# Patient Record
Sex: MALE
[De-identification: names, ages, dates, MRNs, and addresses within clinical notes are randomized per-mention and may not be internally consistent; named-entity substitution may affect disease eponyms.]

---

## 2018-07-04 ENCOUNTER — HOSPITAL ENCOUNTER (EMERGENCY)
Dept: HOSPITAL 42 - ED | Age: 23
Discharge: LEFT BEFORE DISCHARGE | End: 2018-07-04
Payer: MEDICAID

## 2018-07-04 VITALS — BODY MASS INDEX: 27.3 KG/M2

## 2018-07-04 VITALS
SYSTOLIC BLOOD PRESSURE: 136 MMHG | DIASTOLIC BLOOD PRESSURE: 83 MMHG | RESPIRATION RATE: 18 BRPM | OXYGEN SATURATION: 98 % | TEMPERATURE: 98.3 F | HEART RATE: 88 BPM

## 2018-07-04 DIAGNOSIS — R10.31: Primary | ICD-10-CM

## 2018-07-04 DIAGNOSIS — Z91.19: ICD-10-CM

## 2018-07-04 NOTE — ED PDOC
Arrival/HPI





- General


Chief Complaint: Abdominal Pain


Time Seen by Provider: 07/04/18 17:22


Historian: Patient





- History of Present Illness


Narrative History of Present Illness (Text): 





07/04/18 17:23


22 y/o male, no significant pmh, nkda, c/o RLQ pain started today.  Pt. stated 

that he has RLQ pain started acutely this morning, non-radiating, associated 

with walking, stated that he was exercising yesterday but with no fall or trauma

, no numbness or tingling, no urinary symptoms, no fever or chills, no flank 

pain, no other medical or psychological complaints. 





Past Medical History





- Provider Review


Nursing Documentation Reviewed: Yes





- Past History


Past History: No Previous





- Infectious Disease


Hx of Infectious Diseases: None





- Tetanus Immunization


Tetanus Immunization: Up to Date





- Past Medical History


Past Medical History: No Previous





- Psychiatric


Hx Depression: No


Hx Emotional Abuse: No


Hx Physical Abuse: No


Hx Substance Use: No





- Past Surgical History


Past Surgical History: No Previous





- Anesthesia


Hx Anesthesia: No





- Suicidal Assessment


Feels Threatened In Home Enviroment: No





Family/Social History





- Physician Review


Nursing Documentation Reviewed: Yes


Family/Social History: Unknown Family HX


Smoking Status: Unknown If Ever Smoked


Hx Alcohol Use: No


Hx Substance Use: No


Hx Substance Use Treatment: No





Allergies/Home Meds


Allergies/Adverse Reactions: 


Allergies





seasonal Allergy (Uncoded 07/04/18 17:20)


 CONGESTION








Home Medications: 


 Home Meds











 Medication  Instructions  Recorded  Confirmed


 


No Known Home Med [No Known Home  12/21/13 07/04/18





Med]   














Review of Systems





- Review of Systems


Constitutional: absent: Fatigue, Fevers


Eyes: absent: Vision Changes


ENT: absent: Hearing Changes


Respiratory: absent: SOB, Cough


Cardiovascular: absent: Chest Pain


Gastrointestinal: Abdominal Pain.  absent: Diarrhea, Nausea, Vomiting


Skin: absent: Rash, Pruritis


Neurological: absent: Headache, Dizziness


Psychiatric: absent: Anxiety, Depression, Suicidal Ideation





Physical Exam


Vital Signs Reviewed: Yes


Vital Signs











  Temp Pulse Resp BP Pulse Ox


 


 07/04/18 18:35  98.3 F  88  18  136/83  98


 


 07/04/18 17:16  98.3 F  88  18  136/83  98











Temperature: Afebrile


Blood Pressure: Normal


Pulse: Regular


Respiratory Rate: Normal


Appearance: Positive for: Well-Appearing, Non-Toxic, Comfortable


Pain Distress: Moderate


Mental Status: Positive for: Alert and Oriented X 3





- Systems Exam


Head: Present: Atraumatic, Normocephalic


Pupils: Present: PERRL


Extroacular Muscles: Present: EOMI


Conjunctiva: Present: Normal


Mouth: Present: Moist Mucous Membranes


Neck: Present: Normal Range of Motion


Respiratory/Chest: Present: Clear to Auscultation, Good Air Exchange.  No: 

Respiratory Distress, Accessory Muscle Use


Cardiovascular: Present: Regular Rate and Rhythm, Normal S1, S2.  No: Murmurs


Abdomen: Present: Tenderness (+ttp on the RLQ region, no cva tenderness).  No: 

Distention, Peritoneal Signs, Rebound, Guarding


Back: Present: Normal Inspection.  No: CVA Tenderness, Midline Tenderness


Upper Extremity: Present: Normal Inspection.  No: Cyanosis, Edema


Lower Extremity: Present: Normal Inspection.  No: Edema


Neurological: Present: GCS=15, CN II-XII Intact, Speech Normal, Motor Func 

Grossly Intact, Gait Normal, Memory Normal


Skin: Present: Warm, Dry, Normal Color.  No: Rashes


Psychiatric: Present: Alert, Oriented x 3, Normal Insight, Normal Concentration





Medical Decision Making


ED Course and Treatment: 





07/04/18 17:33


Differential: Appencitis vs. Constipation vs. Muscular strain 


-Labs/ua


-CT abdomen and pelvis


-IVF/pepcid/zofran


-Observe and reassess





07/04/18 18:15


-Pt. refused labs and CT scans, risks and benefits explained but the patient 

still refused, spent 15 minutes on the bed side to explained the risks and 

benefits, still design to sign out against medical advice, witnessed by Charge 

ROSARIO Padgett which both explained the risks and benefits.


AMA


AMA ER


The patient refuses to stay in the Emergency Room (ER) to continue the care and 

wishes to leave the emergency department against my medical advice. Patient was 

told that staying in the ER is necessary and a full explanation of the reasons 

why was given, and understood by the patient with alert and oriented x4. The 

risk of leaving were explained in laymans term and including but not limited to 

appendicitis, colitis, bowel perforation, peritonitis, sepsis, organ(s) failures

, pain,  worsening of condition, permanent disability and death from an 

undiagnosed or untreated condition. The patient accepts these risks, and is in 

my judgment is competent and capable of understanding the clinical situation 

and explanation of the risk of leaving. The patient is able to verbally 

repeated me back the above explained risks and benefits back to me, and 

verbally expressed understanding.  Patient was given the opportunity to ask 

questions and change mind. The patient was instructed regarding the best care 

for the present symptoms, and to follow up as soon as possible with the primary 

care doctor including specialist or return to the emergency department at any 

time for continuing care.








-You sign out against medical advice as we request and advised your to stay in 

the ER for blood work and Ct abdomen/pelvis to r/o appendicitis, take tylenol 

for pain as needed.  Please follow up with your own pmd/GI/General surgeon 

immediately, return to the ER any time if you wishes to continue the medical 

care. 








- Medication Orders


Current Medication Orders: 











Discontinued Medications





Famotidine (Pepcid)  20 mg IVP STAT STA


   Stop: 07/04/18 17:31


Sodium Chloride (Sodium Chloride 0.9%)  1,000 mls @ 999 mls/hr IV .Q1H1M STA


   Stop: 07/04/18 18:30


Ondansetron HCl (Zofran Inj)  4 mg IVP STAT STA


   Stop: 07/04/18 17:31











- PA / NP / Resident Statement


MD/ has reviewed & agrees with the documentation as recorded.





Disposition/Present on Arrival





- Present on Arrival


Any Indicators Present on Arrival: No


History of DVT/PE: No


History of Uncontrolled Diabetes: No


Urinary Catheter: No


History of Decub. Ulcer: No


History Surgical Site Infection Following: None





- Disposition


Have Diagnosis and Disposition been Completed?: Yes


Diagnosis: 


 RLQ abdominal pain, Non-compliance





Disposition: AGAINST MEDICAL ADVICE


Disposition Time: 18:05


Condition: STABLE


Additional Instructions: 


-You sign out against medical advice as we request and advised your to stay in 

the ER for blood work and Ct abdomen/pelvis to r/o appendicitis, take tylenol 

for pain as needed.  Please follow up with your own pmd/GI/General surgeon 

immediately, return to the ER any time if you wishes to continue the medical 

care. 


Referrals: 


Jett Finney MD [Staff Provider] - Follow up with primary


Erum Parekh MD [Medical Doctor] - Follow up with primary


[facility] [Outside] - Follow up with primary